# Patient Record
Sex: MALE | Race: ASIAN | Employment: OTHER | URBAN - METROPOLITAN AREA
[De-identification: names, ages, dates, MRNs, and addresses within clinical notes are randomized per-mention and may not be internally consistent; named-entity substitution may affect disease eponyms.]

---

## 2017-01-01 ENCOUNTER — OFFICE VISIT (OUTPATIENT)
Dept: FAMILY MEDICINE CLINIC | Age: 82
End: 2017-01-01

## 2017-01-01 VITALS
RESPIRATION RATE: 14 BRPM | SYSTOLIC BLOOD PRESSURE: 123 MMHG | DIASTOLIC BLOOD PRESSURE: 55 MMHG | HEIGHT: 66 IN | TEMPERATURE: 96.1 F | WEIGHT: 130.8 LBS | BODY MASS INDEX: 21.02 KG/M2 | OXYGEN SATURATION: 98 % | HEART RATE: 64 BPM

## 2017-01-01 DIAGNOSIS — R63.0 DECREASED APPETITE: ICD-10-CM

## 2017-01-01 DIAGNOSIS — F50.9 APPETITE DISORDER: Primary | ICD-10-CM

## 2017-01-01 DIAGNOSIS — I10 ESSENTIAL HYPERTENSION: ICD-10-CM

## 2017-01-01 RX ORDER — LISINOPRIL 10 MG/1
TABLET ORAL
Refills: 3 | COMMUNITY
Start: 2017-01-01 | End: 2018-01-01 | Stop reason: ALTCHOICE

## 2017-01-01 RX ORDER — CARVEDILOL 12.5 MG/1
TABLET ORAL
Refills: 3 | COMMUNITY
Start: 2017-10-19 | End: 2018-01-01 | Stop reason: ALTCHOICE

## 2017-01-01 RX ORDER — MIRTAZAPINE 7.5 MG/1
7.5 TABLET, FILM COATED ORAL
Qty: 30 TAB | Refills: 1 | Status: SHIPPED | OUTPATIENT
Start: 2017-01-01 | End: 2018-01-01 | Stop reason: SDUPTHER

## 2017-01-01 RX ORDER — NYSTATIN 100000 U/G
OINTMENT TOPICAL
COMMUNITY
Start: 2017-01-01 | End: 2018-01-01 | Stop reason: ALTCHOICE

## 2017-01-01 RX ORDER — ASPIRIN 325 MG
TABLET, DELAYED RELEASE (ENTERIC COATED) ORAL
Refills: 3 | COMMUNITY
Start: 2017-01-01 | End: 2018-01-01 | Stop reason: ALTCHOICE

## 2017-01-01 RX ORDER — PRAVASTATIN SODIUM 40 MG/1
TABLET ORAL
Refills: 3 | COMMUNITY
Start: 2017-01-01 | End: 2018-01-01 | Stop reason: ALTCHOICE

## 2017-01-01 RX ORDER — SULFAMETHOXAZOLE AND TRIMETHOPRIM 800; 160 MG/1; MG/1
TABLET ORAL
Refills: 0 | COMMUNITY
Start: 2017-01-01 | End: 2018-01-01 | Stop reason: ALTCHOICE

## 2017-01-01 RX ORDER — TRAMADOL HYDROCHLORIDE 50 MG/1
TABLET ORAL
Refills: 0 | COMMUNITY
Start: 2017-01-01 | End: 2018-01-01 | Stop reason: ALTCHOICE

## 2017-01-24 ENCOUNTER — OFFICE VISIT (OUTPATIENT)
Dept: FAMILY MEDICINE CLINIC | Age: 82
End: 2017-01-24

## 2017-01-24 VITALS
RESPIRATION RATE: 18 BRPM | DIASTOLIC BLOOD PRESSURE: 65 MMHG | OXYGEN SATURATION: 98 % | TEMPERATURE: 95.9 F | BODY MASS INDEX: 20.57 KG/M2 | HEIGHT: 66 IN | SYSTOLIC BLOOD PRESSURE: 127 MMHG | HEART RATE: 79 BPM | WEIGHT: 128 LBS

## 2017-01-24 DIAGNOSIS — R00.0 SINUS TACHYCARDIA: ICD-10-CM

## 2017-01-24 DIAGNOSIS — I10 ESSENTIAL HYPERTENSION WITH GOAL BLOOD PRESSURE LESS THAN 140/90: ICD-10-CM

## 2017-01-24 DIAGNOSIS — R73.03 PREDIABETES: ICD-10-CM

## 2017-01-24 DIAGNOSIS — R20.2 TINGLING IN EXTREMITIES: ICD-10-CM

## 2017-01-24 DIAGNOSIS — E78.00 HYPERCHOLESTEROLEMIA: ICD-10-CM

## 2017-01-24 DIAGNOSIS — M10.9 GOUT OF BIG TOE: Primary | ICD-10-CM

## 2017-01-24 DIAGNOSIS — R94.4 KIDNEY FUNCTION STUDY ABNORMALITY: ICD-10-CM

## 2017-01-24 RX ORDER — LISINOPRIL 30 MG/1
30 TABLET ORAL DAILY
Qty: 90 TAB | Refills: 3 | Status: SHIPPED | OUTPATIENT
Start: 2017-01-24 | End: 2017-02-01 | Stop reason: ALTCHOICE

## 2017-01-24 RX ORDER — OXYCODONE AND ACETAMINOPHEN 5; 325 MG/1; MG/1
1 TABLET ORAL
Qty: 20 TAB | Refills: 0 | Status: SHIPPED | OUTPATIENT
Start: 2017-01-24 | End: 2018-01-01 | Stop reason: ALTCHOICE

## 2017-01-24 RX ORDER — INDOMETHACIN 50 MG/1
50 CAPSULE ORAL 3 TIMES DAILY
Qty: 15 CAP | Refills: 0 | Status: SHIPPED | OUTPATIENT
Start: 2017-01-24 | End: 2017-01-29

## 2017-01-24 RX ORDER — DEXAMETHASONE 1.5 MG/1
TABLET ORAL
Qty: 21 TAB | Refills: 0 | Status: SHIPPED | OUTPATIENT
Start: 2017-01-24 | End: 2018-01-01 | Stop reason: ALTCHOICE

## 2017-01-24 RX ORDER — LISINOPRIL AND HYDROCHLOROTHIAZIDE 20; 25 MG/1; MG/1
1 TABLET ORAL DAILY
Qty: 180 TAB | Refills: 3 | Status: SHIPPED | OUTPATIENT
Start: 2017-01-24 | End: 2017-02-01 | Stop reason: ALTCHOICE

## 2017-01-24 NOTE — MR AVS SNAPSHOT
Visit Information Date & Time Provider Department Dept. Phone Encounter #  
 1/24/2017 12:30 PM Pool Huynh MD Edilberto Miller OFFICE-[facility] [phone] [id number] Your Appointments 6/20/2017 10:30 AM  
PACEMAKER with PACEMAKER, [facility]) Appt Note: 6mo bsc bivpm  
 [id number] [facility]  
[phone] [id number] [facility] Upcoming Health Maintenance Date Due  
 GLAUCOMA SCREENING Q2Y 6/27/1997 INFLUENZA AGE 9 TO ADULT 8/1/2016 MEDICARE YEARLY EXAM 6/29/2017 DTaP/Tdap/Td series (3 - Td) 12/12/2026 Allergies as of 1/24/2017  Review Complete On: 1/24/2017 By: Swapna Miranda LPN No Known Allergies Current Immunizations  Reviewed on 12/2/2015 Name Date Influenza High Dose Vaccine PF 11/3/2014 Influenza Vaccine 12/2/2015 Influenza Vaccine PF 12/19/2013 Influenza Vaccine Split 10/3/2012, 10/22/2011 Pneumococcal Conjugate (PCV-13) 8/22/2013 Td, Adsorbed PF 12/12/2016 Tdap 8/22/2013 Varicella Virus Vaccine 2/20/2014 Not reviewed this visit You Were Diagnosed With   
  
 Codes Comments Gout of big toe    -  Primary ICD-10-CM: M10.9 ICD-9-CM: 274.01 Tingling in extremities     ICD-10-CM: R20.2 ICD-9-CM: 782.0 Essential hypertension with goal blood pressure less than 140/90     ICD-10-CM: I10 
ICD-9-CM: 401.9 Prediabetes     ICD-10-CM: R73.03 
ICD-9-CM: 790.29 Hypercholesterolemia     ICD-10-CM: E78.00 ICD-9-CM: 272.0 Sinus tachycardia     ICD-10-CM: R00.0 ICD-9-CM: 427.89 Kidney function study abnormality     ICD-10-CM: R94.4 ICD-9-CM: 794.4 Vitals BP Pulse Temp Resp Height(growth percentile) Weight(growth percentile) 127/65 (BP 1 Location: Left arm, BP Patient Position: Sitting) 79 95.9 °F (35.5 °C) (Oral) 18 5' 6\" (1.676 m) 128 lb (58.1 kg) SpO2 BMI Smoking Status 98% 20.66 kg/m2 Never Smoker Vitals History BMI and BSA Data Body Mass Index Body Surface Area  
 20.66 kg/m 2 1.64 m 2 Preferred Pharmacy Pharmacy Name Phone [id number] [facility] [phone] Your Updated Medication List  
  
   
This list is accurate as of: 1/24/17  1:39 PM.  Always use your most recent med list.  
  
  
  
  
 aspirin, buffered 81 mg Tab Take  by mouth.  
  
 calcium-cholecalciferol (D3) tablet Commonly known as:  CALTRATE 600+D Take 1 Tab by mouth two (2) times a day. dexamethasone 1.5 mg tablet Commonly known as:  DECADRON  
3 tabs in am 3 tabs in pm first day, 3 tabs in am 2 tabs in pm second day, 2 tabs in am and 2 tabs in pm 3rd day, then 2 tabs in am and one tab in pm fourth day and 1 tab in am, and one tab in pm on the fifth day, then 1 tab in am on the six day ELIQUIS 2.5 mg tablet Generic drug:  apixaban TAKE 1 TABLET BY MOUTH TWICE A DAY  
  
 febuxostat 40 mg Tab tablet Commonly known as:  Constance Pouch Take 1 Tab by mouth daily. Indications: GOUT, GOUT PREVENTION  
  
 FISH OIL 1,000 mg Cap Generic drug:  omega-3 fatty acids-vitamin e Take 1 capsule by mouth daily. hydrocortisone 2.5 % rectal cream  
Commonly known as:  ANUSOL-HC Insert  into rectum four (4) times daily. indomethacin 50 mg capsule Commonly known as:  INDOCIN Take 1 Cap by mouth three (3) times daily for 5 days. lactobacillus rhamnosus gg 10 billion cell 10 billion cell capsule Commonly known as:  PROBIOTIC Take 1 capsule by mouth daily. lisinopril 30 mg tablet Commonly known as:  Rubina Morenoi Take 1 Tab by mouth daily. lisinopril-hydroCHLOROthiazide 20-25 mg per tablet Commonly known as:  Conda Franc Take 1 Tab by mouth daily. metoprolol tartrate 25 mg tablet Commonly known as:  LOPRESSOR Take 1 Tab by mouth every twelve (12) hours. mirtazapine 7.5 mg tablet Commonly known as:  [place] Brightly Take 1 Tab by mouth nightly. nystatin topical cream  
Commonly known as:  MYCOSTATIN Apply  to affected area two (2) times a day. omeprazole 40 mg capsule Commonly known as:  PRILOSEC Take 1 capsule by mouth daily. oxyCODONE-acetaminophen 5-325 mg per tablet Commonly known as:  PERCOCET Take 1 Tab by mouth every eight (8) hours as needed for Pain. Max Daily Amount: 3 Tabs. pramoxine-hydrocortisone 1-1 % topical cream  
Commonly known as:  ANALPRAM HC Apply  to affected area three (3) times daily. predniSONE 20 mg tablet Commonly known as:  Dyana Beam Take 3 Tabs by mouth daily (with breakfast). tamsulosin 0.4 mg capsule Commonly known as:  FLOMAX Take 2 Caps by mouth nightly. Indications: SYMPTOMATIC BENIGN PROSTATIC HYPERPLASIA Prescriptions Printed Refills  
 dexamethasone (DECADRON) 1.5 mg tablet 0 Sig: 3 tabs in am 3 tabs in pm first day, 3 tabs in am 2 tabs in pm second day, 2 tabs in am and 2 tabs in pm 3rd day, then 2 tabs in am and one tab in pm fourth day and 1 tab in am, and one tab in pm on the fifth day, then 1 tab in am on the six day Class: Print  
 oxyCODONE-acetaminophen (PERCOCET) 5-325 mg per tablet 0 Sig: Take 1 Tab by mouth every eight (8) hours as needed for Pain. Max Daily Amount: 3 Tabs. Class: Print Route: Oral  
 indomethacin (INDOCIN) 50 mg capsule 0 Sig: Take 1 Cap by mouth three (3) times daily for 5 days. Class: Print Route: Oral  
 lisinopril-hydroCHLOROthiazide (PRINZIDE, ZESTORETIC) 20-25 mg per tablet 3 Sig: Take 1 Tab by mouth daily. Class: Print Route: Oral  
 lisinopril (PRINIVIL, ZESTRIL) 30 mg tablet 3 Sig: Take 1 Tab by mouth daily. Class: Print Route: Oral  
  
We Performed the Following CBC W/O DIFF [55277 CPT(R)] METABOLIC PANEL, COMPREHENSIVE [87248 CPT(R)] URIC ACID [id number] CPT(R)] Patient Instructions Gout: Care Instructions Your Care Instructions Gout is a form of arthritis caused by a buildup of uric acid crystals in a joint. It causes sudden attacks of pain, swelling, redness, and stiffness, usually in one joint, especially the big toe. Gout usually comes on without a cause. But it can be brought on by drinking alcohol (especially beer) or eating seafood and red meat. Taking certain medicines, such as diuretics or aspirin, also can bring on an attack of gout. Taking your medicines as prescribed and following up with your doctor regularly can help you avoid gout attacks in the future. Follow-up care is a key part of your treatment and safety. Be sure to make and go to all appointments, and call your doctor if you are having problems. Its also a good idea to know your test results and keep a list of the medicines you take. How can you care for yourself at home? · If the joint is swollen, put ice or a cold pack on the area for 10 to 20 minutes at a time. Put a thin cloth between the ice and your skin. · Prop up the sore limb on a pillow when you ice it or anytime you sit or lie down during the next 3 days. Try to keep it above the level of your heart. This will help reduce swelling. · Rest sore joints. Avoid activities that put weight or strain on the joints for a few days. Take short rest breaks from your regular activities during the day. · Take your medicines exactly as prescribed. Call your doctor if you think you are having a problem with your medicine. · Take pain medicines exactly as directed. ¨ If the doctor gave you a prescription medicine for pain, take it as prescribed. ¨ If you are not taking a prescription pain medicine, ask your doctor if you can take an over-the-counter medicine. · Eat less seafood and red meat. · Check with your doctor before drinking alcohol. · Losing weight, if you are overweight, may help reduce attacks of gout. But do not go on a [employer]. \" Losing a lot of weight in a short amount of time can cause a gout attack. When should you call for help? Call your doctor now or seek immediate medical care if: 
· You have a fever. · The joint is so painful you cannot use it. · You have sudden, unexplained swelling, redness, warmth, or severe pain in one or more joints. Watch closely for changes in your health, and be sure to contact your doctor if: 
· You have joint pain. · Your symptoms get worse or are not improving after 2 or 3 days. Where can you learn more? Go to http://suzy-sofya.info/. Enter C279 in the search box to learn more about \"Gout: Care Instructions. \" Current as of: February 24, 2016 Content Version: 11.1 © 4300-3100 [employer]. Care instructions adapted under license by [employer] (which disclaims liability or warranty for this information). If you have questions about a medical condition or this instruction, always ask your healthcare professional. [place] [address] any warranty or liability for your use of this information. Numbness and Tingling: Care Instructions Your Care Instructions Many things can cause numbness or tingling. Swelling may put pressure on a nerve. This could cause you to lose feeling or have a pins-and-needles sensation on part of your body. Nerves may be damaged from trauma, toxins, or diseases, such as diabetes or multiple sclerosis (MS). Sometimes, though, the cause is not clear. If there is no clear reason for your symptoms, and you are not having any other symptoms, your doctor may suggest watching and waiting for a while to see if the numbness or tingling goes away on its own. Your doctor may want you to have blood or nerve tests to find the cause of your symptoms. Follow-up care is a key part of your treatment and safety. Be sure to make and go to all appointments, and call your doctor if you are having problems. It's also a good idea to know your test results and keep a list of the medicines you take. How can you care for yourself at home? · If your doctor prescribes medicine, take it exactly as directed. Call your doctor if you think you are having a problem with your medicine. · If you have any swelling, put ice or a cold pack on the area for 10 to 20 minutes at a time. Put a thin cloth between the ice and your skin. When should you call for help? Call 911 anytime you think you may need emergency care. For example, call if: 
· You have weakness, numbness, or tingling in both legs. · You lose bowel or bladder control. · You have symptoms of a stroke. These may include: 
¨ Sudden numbness, tingling, weakness, or loss of movement in your face, arm, or leg, especially on only one side of your body. ¨ Sudden vision changes. ¨ Sudden trouble speaking. ¨ Sudden confusion or trouble understanding simple statements. ¨ Sudden problems with walking or balance. ¨ A sudden, severe headache that is different from past headaches. Watch closely for changes in your health, and be sure to contact your doctor if you have any problems, or if: 
· You do not get better as expected. Where can you learn more? Go to http://suzy-sofya.info/. Enter B179 in the search box to learn more about \"Numbness and Tingling: Care Instructions. \" Current as of: February 19, 2016 Content Version: 11.1 © 1560-2798 Healthwise[employer] Inc[employer]. Care instructions adapted under license by [employer] (which disclaims liability or warranty for this information). If you have questions about a medical condition or this instruction, always ask your healthcare professional. [address] any warranty or liability for your use of this information. Purine-Restricted Diet: Care Instructions Your Care Instructions Purines are substances that are found in some foods. Your body turns purines into uric acid. High levels of uric acid can cause gout, which is a form of arthritis that causes pain and inflammation in joints. You may be able to help control the amount of uric acid in your body by limiting high-purine foods in your diet. Follow-up care is a key part of your treatment and safety. Be sure to make and go to all appointments, and call your doctor if you are having problems. It's also a good idea to know your test results and keep a list of the medicines you take. How can you care for yourself at home? · Plan your meals and snacks around foods that are low in purines and are safe for you to eat. These foods include: ¨ Green vegetables and tomatoes. ¨ Fruits. ¨ Whole-grain breads, rice, and cereals. ¨ Eggs, peanut butter, and nuts. ¨ Low-fat milk, cheese, and other milk products. ¨ Popcorn. ¨ Gelatin desserts, chocolate, cocoa, and cakes and sweets, in small amounts. · You can eat certain foods that are medium-high in purines, but eat them only once in a while. These foods include: ¨ Legumes, such as dried beans and dried peas. You can have 1 cup cooked legumes each day. ¨ Asparagus, cauliflower, spinach, mushrooms, and green peas. ¨ Fish and seafood (other than very high-purine seafood). ¨ Oatmeal, wheat bran, and wheat germ. · Limit very high-purine foods, including: ¨ Organ meats, such as liver, kidneys, sweetbreads, and brains. ¨ Meats, including sharif, beef, pork, and lamb. ¨ Game meats and any other meats in large amounts. ¨ Anchovies, sardines, herring, mackerel, and scallops. ¨ Gravy. ¨ Beer. Where can you learn more? Go to http://suzy-sofya.info/. Enter F448 in the search box to learn more about \"Purine-Restricted Diet: Care Instructions. \" Current as of: July 26, 2016 Content Version: 11.1 © 7402-5343 Healthwise, Incorporated. Care instructions adapted under license by StyleSeek (which disclaims liability or warranty for this information). If you have questions about a medical condition or this instruction, always ask your healthcare professional. [address] any warranty or liability for your use of this information. Introducing hospitals & HEALTH SERVICES! Dear [employer]: Thank you for requesting a Aternity account. Our records indicate that you have previously registered for a Aternity account but its currently inactive. Please call our Aternity support line at [phone]. Additional Information If you have questions, please visit the Frequently Asked Questions section of the Aternity website at [email or web address] Remember, Aternity is NOT to be used for urgent needs. For medical emergencies, dial 911. Now available from your iPhone and Android! Please provide this summary of care documentation to your next provider. Your primary care clinician is listed as Pradeep Black. If you have any questions after today's visit, please call [phone].

## 2017-01-24 NOTE — PATIENT INSTRUCTIONS
Gout: Care Instructions  Your Care Instructions  Gout is a form of arthritis caused by a buildup of uric acid crystals in a joint. It causes sudden attacks of pain, swelling, redness, and stiffness, usually in one joint, especially the big toe. Gout usually comes on without a cause. But it can be brought on by drinking alcohol (especially beer) or eating seafood and red meat. Taking certain medicines, such as diuretics or aspirin, also can bring on an attack of gout. Taking your medicines as prescribed and following up with your doctor regularly can help you avoid gout attacks in the future. Follow-up care is a key part of your treatment and safety. Be sure to make and go to all appointments, and call your doctor if you are having problems. Its also a good idea to know your test results and keep a list of the medicines you take. How can you care for yourself at home? · If the joint is swollen, put ice or a cold pack on the area for 10 to 20 minutes at a time. Put a thin cloth between the ice and your skin. · Prop up the sore limb on a pillow when you ice it or anytime you sit or lie down during the next 3 days. Try to keep it above the level of your heart. This will help reduce swelling. · Rest sore joints. Avoid activities that put weight or strain on the joints for a few days. Take short rest breaks from your regular activities during the day. · Take your medicines exactly as prescribed. Call your doctor if you think you are having a problem with your medicine. · Take pain medicines exactly as directed. ¨ If the doctor gave you a prescription medicine for pain, take it as prescribed. ¨ If you are not taking a prescription pain medicine, ask your doctor if you can take an over-the-counter medicine. · Eat less seafood and red meat. · Check with your doctor before drinking alcohol. · Losing weight, if you are overweight, may help reduce attacks of gout. But do not go on a [employer]. \" Losing a lot of weight in a short amount of time can cause a gout attack. When should you call for help? Call your doctor now or seek immediate medical care if:  · You have a fever. · The joint is so painful you cannot use it. · You have sudden, unexplained swelling, redness, warmth, or severe pain in one or more joints. Watch closely for changes in your health, and be sure to contact your doctor if:  · You have joint pain. · Your symptoms get worse or are not improving after 2 or 3 days. Where can you learn more? Go to http://suzy-sfoya.info/. Enter S568 in the search box to learn more about \"Gout: Care Instructions. \"  Current as of: February 24, 2016  Content Version: 11.1  © 6347-9575 [employer]. Care instructions adapted under license by [employer]igen (which disclaims liability or warranty for this information). If you have questions about a medical condition or this instruction, always ask your healthcare professional. [place] [address] any warranty or liability for your use of this information. Numbness and Tingling: Care Instructions  Your Care Instructions  Many things can cause numbness or tingling. Swelling may put pressure on a nerve. This could cause you to lose feeling or have a pins-and-needles sensation on part of your body. Nerves may be damaged from trauma, toxins, or diseases, such as diabetes or multiple sclerosis (MS). Sometimes, though, the cause is not clear. If there is no clear reason for your symptoms, and you are not having any other symptoms, your doctor may suggest watching and waiting for a while to see if the numbness or tingling goes away on its own. Your doctor may want you to have blood or nerve tests to find the cause of your symptoms. Follow-up care is a key part of your treatment and safety. Be sure to make and go to all appointments, and call your doctor if you are having problems.  It's also a good idea to know your test results and keep a list of the medicines you take. How can you care for yourself at home? · If your doctor prescribes medicine, take it exactly as directed. Call your doctor if you think you are having a problem with your medicine. · If you have any swelling, put ice or a cold pack on the area for 10 to 20 minutes at a time. Put a thin cloth between the ice and your skin. When should you call for help? Call 911 anytime you think you may need emergency care. For example, call if:  · You have weakness, numbness, or tingling in both legs. · You lose bowel or bladder control. · You have symptoms of a stroke. These may include:  ¨ Sudden numbness, tingling, weakness, or loss of movement in your face, arm, or leg, especially on only one side of your body. ¨ Sudden vision changes. ¨ Sudden trouble speaking. ¨ Sudden confusion or trouble understanding simple statements. ¨ Sudden problems with walking or balance. ¨ A sudden, severe headache that is different from past headaches. Watch closely for changes in your health, and be sure to contact your doctor if you have any problems, or if:  · You do not get better as expected. Where can you learn more? Go to http://suzy-sofya.info/. Enter P624 in the search box to learn more about \"Numbness and Tingling: Care Instructions. \"  Current as of: February 19, 2016  Content Version: 11.1  © 4737-6104 [email or web address].[email or web address]. Care instructions adapted under license by [employer] (which disclaims liability or warranty for this information). If you have questions about a medical condition or this instruction, always ask your healthcare professional. [address] any warranty or liability for your use of this information. Purine-Restricted Diet: Care Instructions  Your Care Instructions  Purines are substances that are found in some foods. Your body turns purines into uric acid.  High levels of uric acid can cause gout, which is a form of arthritis that causes pain and inflammation in joints. You may be able to help control the amount of uric acid in your body by limiting high-purine foods in your diet. Follow-up care is a key part of your treatment and safety. Be sure to make and go to all appointments, and call your doctor if you are having problems. It's also a good idea to know your test results and keep a list of the medicines you take. How can you care for yourself at home? · Plan your meals and snacks around foods that are low in purines and are safe for you to eat. These foods include:  ¨ Green vegetables and tomatoes. ¨ Fruits. ¨ Whole-grain breads, rice, and cereals. ¨ Eggs, peanut butter, and nuts. ¨ Low-fat milk, cheese, and other milk products. ¨ Popcorn. ¨ Gelatin desserts, chocolate, cocoa, and cakes and sweets, in small amounts. · You can eat certain foods that are medium-high in purines, but eat them only once in a while. These foods include:  ¨ Legumes, such as dried beans and dried peas. You can have 1 cup cooked legumes each day. ¨ Asparagus, cauliflower, spinach, mushrooms, and green peas. ¨ Fish and seafood (other than very high-purine seafood). ¨ Oatmeal, wheat bran, and wheat germ. · Limit very high-purine foods, including:  ¨ Organ meats, such as liver, kidneys, sweetbreads, and brains. ¨ Meats, including sharif, beef, pork, and lamb. ¨ Game meats and any other meats in large amounts. ¨ Anchovies, sardines, herring, mackerel, and scallops. ¨ Gravy. ¨ Beer. Where can you learn more? Go to http://suzy-sofya.info/. Enter F448 in the search box to learn more about \"Purine-Restricted Diet: Care Instructions. \"  Current as of: July 26, 2016  Content Version: 11.1  © 4654-6237 [employer]. Care instructions adapted under license by Reclog (which disclaims liability or warranty for this information).  If you have questions about a medical condition or this instruction, always ask your healthcare professional. [place] [address] any warranty or liability for your use of this information.
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
## 2017-01-24 NOTE — PROGRESS NOTES
Chief Complaint   Patient presents with    Toe Pain     right great toe     C/o right great toe pain and swelling x 1 week,  Denies injury,  Hard to bear weight

## 2017-01-25 LAB
ALBUMIN SERPL-MCNC: 4.4 G/DL (ref 3.5–4.7)
ALBUMIN/GLOB SERPL: 1.2 {RATIO} (ref 1.1–2.5)
ALP SERPL-CCNC: 57 IU/L (ref 39–117)
ALT SERPL-CCNC: 24 IU/L (ref 0–44)
AST SERPL-CCNC: 27 IU/L (ref 0–40)
BILIRUB SERPL-MCNC: 0.9 MG/DL (ref 0–1.2)
BUN SERPL-MCNC: 23 MG/DL (ref 8–27)
BUN/CREAT SERPL: 18 (ref 10–22)
CALCIUM SERPL-MCNC: 9.8 MG/DL (ref 8.6–10.2)
CHLORIDE SERPL-SCNC: 98 MMOL/L (ref 96–106)
CO2 SERPL-SCNC: 29 MMOL/L (ref 18–29)
CREAT SERPL-MCNC: 1.27 MG/DL (ref 0.76–1.27)
ERYTHROCYTE [DISTWIDTH] IN BLOOD BY AUTOMATED COUNT: 12.6 % (ref 12.3–15.4)
GLOBULIN SER CALC-MCNC: 3.6 G/DL (ref 1.5–4.5)
GLUCOSE SERPL-MCNC: 110 MG/DL (ref 65–99)
HCT VFR BLD AUTO: 42 % (ref 37.5–51)
HGB BLD-MCNC: 14.3 G/DL (ref 12.6–17.7)
MCH RBC QN AUTO: 31.6 PG (ref 26.6–33)
MCHC RBC AUTO-ENTMCNC: 34 G/DL (ref 31.5–35.7)
MCV RBC AUTO: 93 FL (ref 79–97)
PLATELET # BLD AUTO: 244 X10E3/UL (ref 150–379)
POTASSIUM SERPL-SCNC: 3.7 MMOL/L (ref 3.5–5.2)
PROT SERPL-MCNC: 8 G/DL (ref 6–8.5)
RBC # BLD AUTO: 4.52 X10E6/UL (ref 4.14–5.8)
SODIUM SERPL-SCNC: 142 MMOL/L (ref 134–144)
URATE SERPL-MCNC: 11.7 MG/DL (ref 3.7–8.6)
WBC # BLD AUTO: 8 X10E3/UL (ref 3.4–10.8)

## 2017-02-01 ENCOUNTER — OFFICE VISIT (OUTPATIENT)
Dept: FAMILY MEDICINE CLINIC | Age: 82
End: 2017-02-01

## 2017-02-01 ENCOUNTER — TELEPHONE (OUTPATIENT)
Dept: CARDIOLOGY CLINIC | Age: 82
End: 2017-02-01

## 2017-02-01 VITALS
DIASTOLIC BLOOD PRESSURE: 87 MMHG | TEMPERATURE: 97 F | HEIGHT: 66 IN | HEART RATE: 74 BPM | OXYGEN SATURATION: 96 % | WEIGHT: 134.8 LBS | BODY MASS INDEX: 21.66 KG/M2 | SYSTOLIC BLOOD PRESSURE: 177 MMHG | RESPIRATION RATE: 12 BRPM

## 2017-02-01 DIAGNOSIS — Z13.5 SCREENING FOR GLAUCOMA: Primary | ICD-10-CM

## 2017-02-01 DIAGNOSIS — M10.271 ACUTE DRUG-INDUCED GOUT INVOLVING TOE OF RIGHT FOOT: ICD-10-CM

## 2017-02-01 DIAGNOSIS — R73.03 PREDIABETES: ICD-10-CM

## 2017-02-01 DIAGNOSIS — E78.00 HYPERCHOLESTEROLEMIA: ICD-10-CM

## 2017-02-01 DIAGNOSIS — I10 ESSENTIAL HYPERTENSION WITH GOAL BLOOD PRESSURE LESS THAN 140/90: ICD-10-CM

## 2017-02-01 DIAGNOSIS — R94.4 KIDNEY FUNCTION STUDY ABNORMALITY: ICD-10-CM

## 2017-02-01 RX ORDER — METOPROLOL TARTRATE 25 MG/1
25 TABLET, FILM COATED ORAL EVERY 12 HOURS
Qty: 180 TAB | Refills: 3 | Status: SHIPPED | OUTPATIENT
Start: 2017-02-01 | End: 2018-01-01 | Stop reason: ALTCHOICE

## 2017-02-01 RX ORDER — AMLODIPINE AND OLMESARTAN MEDOXOMIL 5; 20 MG/1; MG/1
1 TABLET ORAL DAILY
Qty: 90 TAB | Refills: 3 | Status: SHIPPED | OUTPATIENT
Start: 2017-02-01 | End: 2018-01-01 | Stop reason: ALTCHOICE

## 2017-02-01 RX ORDER — APIXABAN 2.5 MG/1
TABLET, FILM COATED ORAL
Qty: 180 TAB | Refills: 2 | Status: SHIPPED | OUTPATIENT
Start: 2017-02-01 | End: 2018-01-01 | Stop reason: ALTCHOICE
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
## 2017-02-01 NOTE — TELEPHONE ENCOUNTER
Spoke with Cheyanne Stone from Dr. Jackie Rodriguez office. Requested refill for Eliquis for 90 days since patient is going to [facility] for 3 months.

## 2017-02-01 NOTE — TELEPHONE ENCOUNTER
Nini Iglesias from 's office called and left message on my voice mail stating that pt is going out of country and this is concerning his Eliquis. She is asking that someone call back. He is joyce pt and has not seen Yovany in over 2 years.

## 2017-02-01 NOTE — MR AVS SNAPSHOT
Visit Information Date & Time Provider Department Dept. Phone Encounter #  
 2/1/2017 10:15 AM Jas Zamudio MD [address] [facility] OFFICE-ANNEX [phone] [id number] Your Appointments 6/20/2017 10:30 AM  
PACEMAKER with PACEMAKER, [facility] [address]) Appt Note: 6mo bsc bivpm  
 [address]  
[phone] [address] Upcoming Health Maintenance Date Due  
 GLAUCOMA SCREENING Q2Y 6/27/1997 INFLUENZA AGE 9 TO ADULT 8/1/2016 MEDICARE YEARLY EXAM 6/29/2017 DTaP/Tdap/Td series (3 - Td) 12/12/2026 Allergies as of 2/1/2017  Review Complete On: 1/24/2017 By: Isabella Louis LPN No Known Allergies Current Immunizations  Reviewed on 12/2/2015 Name Date Influenza High Dose Vaccine PF 11/3/2014 Influenza Vaccine 12/2/2015 Influenza Vaccine PF 12/19/2013 Influenza Vaccine Split 10/3/2012, 10/22/2011 Pneumococcal Conjugate (PCV-13) 8/22/2013 Td, Adsorbed PF 12/12/2016 Tdap 8/22/2013 Varicella Virus Vaccine 2/20/2014 Not reviewed this visit You Were Diagnosed With   
  
 Codes Comments Screening for glaucoma    -  Primary ICD-10-CM: Z13.5 ICD-9-CM: V80.1 Acute drug-induced gout involving toe of right foot     ICD-10-CM: M10.271 ICD-9-CM: 274.01 Essential hypertension with goal blood pressure less than 140/90     ICD-10-CM: I10 
ICD-9-CM: 401.9 Prediabetes     ICD-10-CM: R73.03 
ICD-9-CM: 790.29 Hypercholesterolemia     ICD-10-CM: E78.00 ICD-9-CM: 272.0 Kidney function study abnormality     ICD-10-CM: R94.4 ICD-9-CM: 794.4 Vitals BP Pulse Temp Resp Height(growth percentile) Weight(growth percentile) 177/87 (BP 1 Location: Left arm, BP Patient Position: At rest) 74 97 °F (36.1 °C) (Oral) 12 5' 6\" (1.676 m) 134 lb 12.8 oz (61.1 kg) SpO2 BMI Smoking Status 96% 21.76 kg/m2 Never Smoker Vitals History BMI and BSA Data Body Mass Index Body Surface Area 21.76 kg/m 2 1.69 m 2 Preferred Pharmacy Pharmacy Name Phone [phone] [phone] Your Updated Medication List  
  
   
This list is accurate as of: 2/1/17 11:06 AM.  Always use your most recent med list. amLODIPine-Olmesartan 5-20 mg Tab Commonly known as:  MAGALIE Take 1 Tab by mouth daily. aspirin, buffered 81 mg Tab Take  by mouth.  
  
 calcium-cholecalciferol (D3) tablet Commonly known as:  CALTRATE 600+D Take 1 Tab by mouth two (2) times a day. dexamethasone 1.5 mg tablet Commonly known as:  DECADRON  
3 tabs in am 3 tabs in pm first day, 3 tabs in am 2 tabs in pm second day, 2 tabs in am and 2 tabs in pm 3rd day, then 2 tabs in am and one tab in pm fourth day and 1 tab in am, and one tab in pm on the fifth day, then 1 tab in am on the six day ELIQUIS 2.5 mg tablet Generic drug:  apixaban TAKE 1 TABLET BY MOUTH TWICE A DAY  
  
 febuxostat 40 mg Tab tablet Commonly known as:  Patsie Reddish Take 1 Tab by mouth daily. Indications: GOUT, GOUT PREVENTION  
  
 FISH OIL 1,000 mg Cap Generic drug:  omega-3 fatty acids-vitamin e Take 1 capsule by mouth daily. hydrocortisone 2.5 % rectal cream  
Commonly known as:  ANUSOL-HC Insert  into rectum four (4) times daily. lactobacillus rhamnosus gg 10 billion cell 10 billion cell capsule Commonly known as:  PROBIOTIC Take 1 capsule by mouth daily. metoprolol tartrate 25 mg tablet Commonly known as:  LOPRESSOR Take 1 Tab by mouth every twelve (12) hours. mirtazapine 7.5 mg tablet Commonly known as:  Ardath Keyla Take 1 Tab by mouth nightly. nystatin topical cream  
Commonly known as:  MYCOSTATIN Apply  to affected area two (2) times a day. omeprazole 40 mg capsule Commonly known as:  PRILOSEC Take 1 capsule by mouth daily. oxyCODONE-acetaminophen 5-325 mg per tablet Commonly known as:  PERCOCET Take 1 Tab by mouth every eight (8) hours as needed for Pain. Max Daily Amount: 3 Tabs. pramoxine-hydrocortisone 1-1 % topical cream  
Commonly known as:  ANALPRAM HC Apply  to affected area three (3) times daily. tamsulosin 0.4 mg capsule Commonly known as:  FLOMAX Take 2 Caps by mouth nightly. Indications: SYMPTOMATIC BENIGN PROSTATIC HYPERPLASIA Prescriptions Printed Refills  
 amLODIPine-Olmesartan (MAGALIE) 5-20 mg tab 3 Sig: Take 1 Tab by mouth daily. Class: Print Route: Oral  
 metoprolol tartrate (LOPRESSOR) 25 mg tablet 3 Sig: Take 1 Tab by mouth every twelve (12) hours. Class: Print Route: Oral  
  
We Performed the Following REFERRAL TO OPTOMETRY [id number] Custom] Comments:  
 Please evaluate patient for screen/glaucoma. Referral Information Referral ID Referred By Referred To  
  
 [id number] ASHRAFI, ABBAS Cherylle Kayser, MD   
   [id number], [address] [place] [address] Street Phone: [phone] Fax: [phone] Visits Status Start Date End Date 1 New Request 2/1/17 2/1/18 If your referral has a status of pending review or denied, additional information will be sent to support the outcome of this decision. Introducing [facility]! Dear [employer]: Thank you for requesting a [employer] account. Our records indicate that you have previously registered for a [employer] account but its currently inactive. Please call our [employer] support line at [phone]. Additional Information If you have questions, please visit the Frequently Asked Questions section of the [employer] website at [email or web address] Remember, [employer] is NOT to be used for urgent needs. For medical emergencies, dial 911. Now available from your iPhone and Android! Please provide this summary of care documentation to your next provider. Your primary care clinician is listed as Mavis Coronel. If you have any questions after today's visit, please call [phone].

## 2017-02-09 RX ORDER — INDOMETHACIN 50 MG/1
CAPSULE ORAL
Qty: 15 CAP | Refills: 15 | Status: CANCELLED | OUTPATIENT
Start: 2017-02-09

## 2017-02-13 ENCOUNTER — TELEPHONE (OUTPATIENT)
Dept: FAMILY MEDICINE CLINIC | Age: 82
End: 2017-02-13

## 2017-02-13 NOTE — TELEPHONE ENCOUNTER
Received call from Dr Rubina Hicks with [facility] ICU, stated pt is in ICU for gi bleeding. Stated pt will be scoped this morning. Requesting pts history and reason on eliquis,  Informed her of pts medical history and that eliquis was given by pts cardiologist for his hx of a flutter and pacer. She stated understanding.     Message sent to DR Delmy Scruggs

## 2017-12-13 PROBLEM — F50.9 APPETITE DISORDER: Status: ACTIVE | Noted: 2017-01-01
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
## 2017-12-13 NOTE — PATIENT INSTRUCTIONS
Anorexia: Care Instructions  Your Care Instructions    Anorexia is a type of eating disorder. People who have anorexia don't eat enough to stay at a normal weight. Sometimes they also exercise too much. They do these things because they're so afraid of gaining weight. They believe that they're fat, even when they're thin. Often they think that losing even more weight will solve their problems and make their lives better. If you have anorexia, it can really harm your health. This is because your body doesn't get the nutrition it needs. The first step to controlling the problem is admitting that something is wrong. Then counseling can help you change how you think about food, the way you see your body, and any other emotional issues. It may take months or years, but you can recover from anorexia. Follow-up care is a key part of your treatment and safety. Be sure to make and go to all appointments, and call your doctor if you are having problems. It's also a good idea to know your test results and keep a list of the medicines you take. How can you care for yourself at home? Follow your treatment plan  · Go to your counseling sessions. Call or talk with your counselor if you can't go or if you think the sessions aren't helping. Don't just stop going. · Take your medicines exactly as prescribed. Call your doctor if you think you are having a problem with your medicine. You will get more details on the specific medicines your doctor prescribes. Trust people who are helping you  · Listen to what counselors and nutrition experts say about healthy eating. · Learn about what is included in a balanced diet. Then discuss what you learn. · Let people know how you are feeling. Listen to how they are feeling too. · Accept support and feedback from other people. Learn to be easier on yourself  · Learn to focus on your good qualities. · If your body feels weak, slow down and do less.   · Remind yourself that feeling bad about yourself is all part of your disorder. · Remember that it takes time to recover from anorexia. · Spend time with other people doing things you enjoy. · Try to focus on one goal at a time. · Don't blame yourself for your disorder. Develop healthy eating habits  · With your doctor and counselor, you will decide on a good weight for you. You will also decide how much weight you will gain each week. · Try not to argue with the people you eat with. Arguing increases stress. And stress can make make it harder for you to relax and eat. · Talk with other people during meals about things that interest you. Don't talk about food or gaining weight. Caring for a loved one with anorexia  · Remind yourself that anorexia is a long-lasting disorder. It takes time for changes to occur. · Show love and support for your loved one, especially if he or she gets discouraged. · Support your loved one as he or she goes through the steps of recovery. Focus on wellness. Don't focus on food. · Take care of yourself. Continue with your own interests, career, hobbies, and friends. · Don't blame yourself or look for the reason for the disorder. · If you are having a hard time, talk with a counselor. · Learn what to do if your loved one relapses. When should you call for help? Call 911 anytime you think you may need emergency care. For example, call if:  · A person with anorexia seems depressed and is talking about suicide. If the talk about suicide seems real, stay with the person, or ask someone you trust to stay with the person, until you get emergency help. · You have a rapid or irregular heartbeat. · You passed out (lost consciousness). Call your doctor now or seek immediate medical care if:  · You feel hopeless or have thoughts of hurting yourself. Watch closely for changes in your health, and be sure to contact your doctor if:  · You have trouble sleeping. · You feel anxious or depressed.   Where can you learn more?  Go to http://suzy-sofya.info/. Enter F701 in the search box to learn more about \"Anorexia: Care Instructions. \"  Current as of: May 12, 2017  Content Version: 11.4  © 3023-6039 [employer]. Care instructions adapted under license by [employer] (which disclaims liability or warranty for this information). If you have questions about a medical condition or this instruction, always ask your healthcare professional. [address] any warranty or liability for your use of this information. Low Sodium Diet (2,000 Milligram): Care Instructions  Your Care Instructions    Too much sodium causes your body to hold on to extra water. This can raise your blood pressure and force your heart and kidneys to work harder. In very serious cases, this could cause you to be put in the hospital. It might even be life-threatening. By limiting sodium, you will feel better and lower your risk of serious problems. The most common source of sodium is salt. People get most of the salt in their diet from canned, prepared, and packaged foods. Fast food and restaurant meals also are very high in sodium. Your doctor will probably limit your sodium to less than 2,000 milligrams (mg) a day. This limit counts all the sodium in prepared and packaged foods and any salt you add to your food. Follow-up care is a key part of your treatment and safety. Be sure to make and go to all appointments, and call your doctor if you are having problems. It's also a good idea to know your test results and keep a list of the medicines you take. How can you care for yourself at home? Read food labels  · Read labels on cans and food packages. The labels tell you how much sodium is in each serving. Make sure that you look at the serving size. If you eat more than the serving size, you have eaten more sodium. · Food labels also tell you the Percent Daily Value for sodium.  Choose products with low Percent Daily Values for sodium. · Be aware that sodium can come in forms other than salt, including monosodium glutamate (MSG), sodium citrate, and sodium bicarbonate (baking soda). MSG is often added to Asian food. When you eat out, you can sometimes ask for food without MSG or added salt. Buy low-sodium foods  · Buy foods that are labeled \"unsalted\" (no salt added), \"sodium-free\" (less than 5 mg of sodium per serving), or \"low-sodium\" (less than 140 mg of sodium per serving). Foods labeled \"reduced-sodium\" and \"light sodium\" may still have too much sodium. Be sure to read the label to see how much sodium you are getting. · Buy fresh vegetables, or frozen vegetables without added sauces. Buy low-sodium versions of canned vegetables, soups, and other canned goods. Prepare low-sodium meals  · Cut back on the amount of salt you use in cooking. This will help you adjust to the taste. Do not add salt after cooking. One teaspoon of salt has about 2,300 mg of sodium. · Take the salt shaker off the table. · Flavor your food with garlic, lemon juice, onion, vinegar, herbs, and spices. Do not use soy sauce, lite soy sauce, steak sauce, onion salt, garlic salt, celery salt, mustard, or ketchup on your food. · Use low-sodium salad dressings, sauces, and ketchup. Or make your own salad dressings and sauces without adding salt. · Use less salt (or none) when recipes call for it. You can often use half the salt a recipe calls for without losing flavor. Other foods such as rice, pasta, and grains do not need added salt. · Rinse canned vegetables, and cook them in fresh water. This removes some-but not all-of the salt. · Avoid water that is naturally high in sodium or that has been treated with water softeners, which add sodium. Call your local water company to find out the sodium content of your water supply. If you buy bottled water, read the label and choose a sodium-free brand.   Avoid high-sodium foods  · Avoid eating:  ¨ Smoked, cured, salted, and canned meat, fish, and poultry. ¨ Ham, sharif, hot dogs, and luncheon meats. ¨ Regular, hard, and processed cheese and regular peanut butter. ¨ Crackers with salted tops, and other salted snack foods such as pretzels, chips, and salted popcorn. ¨ Frozen prepared meals, unless labeled low-sodium. ¨ Canned and dried soups, broths, and bouillon, unless labeled sodium-free or low-sodium. ¨ Canned vegetables, unless labeled sodium-free or low-sodium. ¨ [employer] fries, pizza, tacos, and other fast foods. ¨ Pickles, olives, ketchup, and other condiments, especially soy sauce, unless labeled sodium-free or low-sodium. Where can you learn more? Go to http://suzy-sofya.info/. Enter L354 in the search box to learn more about \"Low Sodium Diet (2,000 Milligram): Care Instructions. \"  Current as of: May 12, 2017  Content Version: 11.4  © 5563-6612 [employer]. Care instructions adapted under license by [employer] (which disclaims liability or warranty for this information). If you have questions about a medical condition or this instruction, always ask your healthcare professional. [place] [address] any warranty or liability for your use of this information.

## 2017-12-13 NOTE — MR AVS SNAPSHOT
Visit Information Date & Time Provider Department Dept. Phone Encounter #  
 12/13/2017  2:30 PM Kathy Mathew MD [address] [facility] [phone] [id number] Upcoming Health Maintenance Date Due  
 GLAUCOMA SCREENING Q2Y 6/27/1997 MEDICARE YEARLY EXAM 6/29/2017 Influenza Age 5 to Adult 8/1/2017 DTaP/Tdap/Td series (3 - Td) 12/12/2026 Allergies as of 12/13/2017  Review Complete On: 12/13/2017 By: Deborah Matthew LPN No Known Allergies Current Immunizations  Reviewed on 2/8/2017 Name Date Influenza High Dose Vaccine PF 11/3/2014 Influenza Vaccine 12/2/2015 Influenza Vaccine PF 12/19/2013 Influenza Vaccine Split 10/3/2012, 10/22/2011 Pneumococcal Conjugate (PCV-13) 8/22/2013 Td, Adsorbed PF 12/12/2016 Tdap 8/22/2013 Varicella Virus Vaccine 2/20/2014 Not reviewed this visit You Were Diagnosed With   
  
 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 
ICD-9-CM: 401.9 Vitals BP Pulse Temp Resp Height(growth percentile) Weight(growth percentile) 123/55 (BP 1 Location: Left arm, BP Patient Position: At rest) 64 96.1 °F (35.6 °C) (Oral) 14 5' 6\" (1.676 m) 130 lb 12.8 oz (59.3 kg) SpO2 BMI Smoking Status 98% 21.11 kg/m2 Never Smoker Vitals History BMI and BSA Data Body Mass Index Body Surface Area  
 21.11 kg/m 2 1.66 m 2 Preferred Pharmacy Pharmacy Name Phone [id number] [phone] [facility] [phone] Your Updated Medication List  
  
   
This list is accurate as of: 12/13/17  4:38 PM.  Always use your most recent med list. amLODIPine-Olmesartan 5-20 mg Tab Commonly known as:  MAGALIE Take 1 Tab by mouth daily. aspirin delayed-release 325 mg tablet TAKE 1 TABLET BY MOUTH EVERY DAY  
  
 aspirin, buffered 81 mg Tab Take  by mouth.  
  
 calcium-cholecalciferol (D3) tablet Commonly known as:  CALTRATE 600+D Take 1 Tab by mouth two (2) times a day. carvedilol 12.5 mg tablet Commonly known as:  COREG  
TAKE 1 TABLET BY MOUTH EVERY DAY  
  
 dexamethasone 1.5 mg tablet Commonly known as:  DECADRON  
3 tabs in am 3 tabs in pm first day, 3 tabs in am 2 tabs in pm second day, 2 tabs in am and 2 tabs in pm 3rd day, then 2 tabs in am and one tab in pm fourth day and 1 tab in am, and one tab in pm on the fifth day, then 1 tab in am on the six day ELIQUIS 2.5 mg tablet Generic drug:  apixaban TAKE 1 TABLET BY MOUTH TWICE A DAY  
  
 febuxostat 40 mg Tab tablet Commonly known as:  Milo Arrow Take 1 Tab by mouth daily. Indications: GOUT, GOUT PREVENTION  
  
 FISH OIL 1,000 mg Cap Generic drug:  omega-3 fatty acids-vitamin e Take 1 capsule by mouth daily. food supplemt, lactose-reduced Liqd Commonly known as:  ENSURE Take 237 mL by mouth four (4) times daily. hydrocortisone 2.5 % rectal cream  
Commonly known as:  ANUSOL-HC Insert  into rectum four (4) times daily. lactobacillus rhamnosus gg 10 billion cell 10 billion cell capsule Commonly known as:  PROBIOTIC Take 1 capsule by mouth daily. lisinopril 10 mg tablet Commonly known as:  PRINIVIL, ZESTRIL  
TAKE 1 TABLET BY MOUTH EVERY DAY  
  
 metoprolol tartrate 25 mg tablet Commonly known as:  LOPRESSOR Take 1 Tab by mouth every twelve (12) hours. mirtazapine 7.5 mg tablet Commonly known as:  Harrod Hoop Take 1 Tab by mouth nightly. * nystatin topical cream  
Commonly known as:  MYCOSTATIN Apply  to affected area two (2) times a day. * nystatin 100,000 unit/gram ointment Commonly known as:  MYCOSTATIN  
  
 omeprazole 40 mg capsule Commonly known as:  PRILOSEC  
 Take 1 capsule by mouth daily. oxyCODONE-acetaminophen 5-325 mg per tablet Commonly known as:  PERCOCET Take 1 Tab by mouth every eight (8) hours as needed for Pain. Max Daily Amount: 3 Tabs. pramoxine-hydrocortisone 1-1 % topical cream  
Commonly known as:  ANALPRAM HC Apply  to affected area three (3) times daily. pravastatin 40 mg tablet Commonly known as:  PRAVACHOL  
TAKE 1 TABLET BY MOUTH EVERY DAY  
  
 tamsulosin 0.4 mg capsule Commonly known as:  FLOMAX Take 2 Caps by mouth nightly. Indications: SYMPTOMATIC BENIGN PROSTATIC HYPERPLASIA  
  
 traMADol 50 mg tablet Commonly known as:  ULTRAM  
TAKE 1 TABLET BY MOUTH EVERY 6 TO 8 HOURS AS NEEDED FOR PAIN  
  
 trimethoprim-sulfamethoxazole 160-800 mg per tablet Commonly known as:  BACTRIM DS, SEPTRA DS  
TAKE 1 TABLET BY MOUTH EVERY 12 HOURS UNTIL FINISHED * Notice: This list has 2 medication(s) that are the same as other medications prescribed for you. Read the directions carefully, and ask your doctor or other care provider to review them with you. Prescriptions Printed Refills  
 food supplemt, lactose-reduced (ENSURE) liqd 1 Sig: Take 237 mL by mouth four (4) times daily. Class: Print Route: Oral  
 mirtazapine (REMERON) 7.5 mg tablet 1 Sig: Take 1 Tab by mouth nightly. Class: Print Route: Oral  
  
We Performed the Following AMB POC URINALYSIS DIP STICK AUTO W/O MICRO [45140 CPT(R)] CBC W/O DIFF [85164 CPT(R)] METABOLIC PANEL, COMPREHENSIVE [69421 CPT(R)] TSH 3RD GENERATION [73985 CPT(R)] Patient Instructions Anorexia: Care Instructions Your Care Instructions Anorexia is a type of eating disorder. People who have anorexia don't eat enough to stay at a normal weight. Sometimes they also exercise too much. They do these things because they're so afraid of gaining weight. They believe that they're fat, even when they're thin.  Often they think that losing even more weight will solve their problems and make their lives better. If you have anorexia, it can really harm your health. This is because your body doesn't get the nutrition it needs. The first step to controlling the problem is admitting that something is wrong. Then counseling can help you change how you think about food, the way you see your body, and any other emotional issues. It may take months or years, but you can recover from anorexia. Follow-up care is a key part of your treatment and safety. Be sure to make and go to all appointments, and call your doctor if you are having problems. It's also a good idea to know your test results and keep a list of the medicines you take. How can you care for yourself at home? Follow your treatment plan · Go to your counseling sessions. Call or talk with your counselor if you can't go or if you think the sessions aren't helping. Don't just stop going. · Take your medicines exactly as prescribed. Call your doctor if you think you are having a problem with your medicine. You will get more details on the specific medicines your doctor prescribes. Trust people who are helping you · Listen to what counselors and nutrition experts say about healthy eating. · Learn about what is included in a balanced diet. Then discuss what you learn. · Let people know how you are feeling. Listen to how they are feeling too. · Accept support and feedback from other people. Learn to be easier on yourself · Learn to focus on your good qualities. · If your body feels weak, slow down and do less. · Remind yourself that feeling bad about yourself is all part of your disorder. · Remember that it takes time to recover from anorexia. · Spend time with other people doing things you enjoy. · Try to focus on one goal at a time. · Don't blame yourself for your disorder. Develop healthy eating habits · With your doctor and counselor, you will decide on a good weight for you. You will also decide how much weight you will gain each week. · Try not to argue with the people you eat with. Arguing increases stress. And stress can make make it harder for you to relax and eat. · Talk with other people during meals about things that interest you. Don't talk about food or gaining weight. Caring for a loved one with anorexia · Remind yourself that anorexia is a long-lasting disorder. It takes time for changes to occur. · Show love and support for your loved one, especially if he or she gets discouraged. · Support your loved one as he or she goes through the steps of recovery. Focus on wellness. Don't focus on food. · Take care of yourself. Continue with your own interests, career, hobbies, and friends. · Don't blame yourself or look for the reason for the disorder. · If you are having a hard time, talk with a counselor. · Learn what to do if your loved one relapses. When should you call for help? Call 911 anytime you think you may need emergency care. For example, call if: · A person with anorexia seems depressed and is talking about suicide. If the talk about suicide seems real, stay with the person, or ask someone you trust to stay with the person, until you get emergency help. · You have a rapid or irregular heartbeat. · You passed out (lost consciousness). Call your doctor now or seek immediate medical care if: 
· You feel hopeless or have thoughts of hurting yourself. Watch closely for changes in your health, and be sure to contact your doctor if: 
· You have trouble sleeping. · You feel anxious or depressed. Where can you learn more? Go to http://suzy-sofya.info/. Enter Z723 in the search box to learn more about \"Anorexia: Care Instructions. \" Current as of: May 12, 2017 Content Version: 11.4 © 0642-0231 [employer], [employer].  Care instructions adapted under license by [employer] (which disclaims liability or warranty for this information). If you have questions about a medical condition or this instruction, always ask your healthcare professional. [address] any warranty or liability for your use of this information. Low Sodium Diet (2,000 Milligram): Care Instructions Your Care Instructions Too much sodium causes your body to hold on to extra water. This can raise your blood pressure and force your heart and kidneys to work harder. In very serious cases, this could cause you to be put in the hospital. It might even be life-threatening. By limiting sodium, you will feel better and lower your risk of serious problems. The most common source of sodium is salt. People get most of the salt in their diet from canned, prepared, and packaged foods. Fast food and restaurant meals also are very high in sodium. Your doctor will probably limit your sodium to less than 2,000 milligrams (mg) a day. This limit counts all the sodium in prepared and packaged foods and any salt you add to your food. Follow-up care is a key part of your treatment and safety. Be sure to make and go to all appointments, and call your doctor if you are having problems. It's also a good idea to know your test results and keep a list of the medicines you take. How can you care for yourself at home? Read food labels · Read labels on cans and food packages. The labels tell you how much sodium is in each serving. Make sure that you look at the serving size. If you eat more than the serving size, you have eaten more sodium. · Food labels also tell you the Percent Daily Value for sodium. Choose products with low Percent Daily Values for sodium. · Be aware that sodium can come in forms other than salt, including monosodium glutamate (MSG), sodium citrate, and sodium bicarbonate (baking soda). MSG is often added to Asian food. When you eat out, you can sometimes ask for food without MSG or added salt. Buy low-sodium foods · Buy foods that are labeled \"unsalted\" (no salt added), \"sodium-free\" (less than 5 mg of sodium per serving), or \"low-sodium\" (less than 140 mg of sodium per serving). Foods labeled \"reduced-sodium\" and \"light sodium\" may still have too much sodium. Be sure to read the label to see how much sodium you are getting. · Buy fresh vegetables, or frozen vegetables without added sauces. Buy low-sodium versions of canned vegetables, soups, and other canned goods. Prepare low-sodium meals · Cut back on the amount of salt you use in cooking. This will help you adjust to the taste. Do not add salt after cooking. One teaspoon of salt has about 2,300 mg of sodium. · Take the salt shaker off the table. · Flavor your food with garlic, lemon juice, onion, vinegar, herbs, and spices. Do not use soy sauce, lite soy sauce, steak sauce, onion salt, garlic salt, celery salt, mustard, or ketchup on your food. · Use low-sodium salad dressings, sauces, and ketchup. Or make your own salad dressings and sauces without adding salt. · Use less salt (or none) when recipes call for it. You can often use half the salt a recipe calls for without losing flavor. Other foods such as rice, pasta, and grains do not need added salt. · Rinse canned vegetables, and cook them in fresh water. This removes some-but not all-of the salt. · Avoid water that is naturally high in sodium or that has been treated with water softeners, which add sodium. Call your local water company to find out the sodium content of your water supply. If you buy bottled water, read the label and choose a sodium-free brand. Avoid high-sodium foods · Avoid eating: ¨ Smoked, cured, salted, and canned meat, fish, and poultry. ¨ Ham, sharif, hot dogs, and luncheon meats. ¨ Regular, hard, and processed cheese and regular peanut butter. ¨ Crackers with salted tops, and other salted snack foods such as pretzels, chips, and salted popcorn. ¨ Frozen prepared meals, unless labeled low-sodium. ¨ Canned and dried soups, broths, and bouillon, unless labeled sodium-free or low-sodium. ¨ Canned vegetables, unless labeled sodium-free or low-sodium. ¨ Western Marti fries, pizza, tacos, and other fast foods. ¨ Pickles, olives, ketchup, and other condiments, especially soy sauce, unless labeled sodium-free or low-sodium. Where can you learn more? Go to http://suzy-sofya.info/. Enter W457 in the search box to learn more about \"Low Sodium Diet (2,000 Milligram): Care Instructions. \" Current as of: May 12, 2017 Content Version: 11.4 © 0742-5654 [employer]. Care instructions adapted under license by [facility] (which disclaims liability or warranty for this information). If you have questions about a medical condition or this instruction, always ask your healthcare professional. [place] [address] any warranty or liability for your use of this information. Introducing [facility]! Dear Leola Cabral: Thank you for requesting a [facility] account. Our records indicate that you have previously registered for a [facility] account but its currently inactive. Please call our [facility] support line at [phone]. Additional Information If you have questions, please visit the Frequently Asked Questions section of the [employer] website at [email or web address] Remember, [employer] is NOT to be used for urgent needs. For medical emergencies, dial 911. Now available from your iPhone and Android! Please provide this summary of care documentation to your next provider. Your primary care clinician is listed as Rhea Garcia. If you have any questions after today's visit, please call [phone].

## 2017-12-13 NOTE — PROGRESS NOTES
Name and  verified      Chief Complaint   Patient presents with    Decreased Appetite     X 2 months         Health Maintenance reviewed-discussed with patient.

## 2018-01-01 ENCOUNTER — OFFICE VISIT (OUTPATIENT)
Dept: FAMILY MEDICINE CLINIC | Age: 83
End: 2018-01-01

## 2018-01-01 VITALS
OXYGEN SATURATION: 98 % | SYSTOLIC BLOOD PRESSURE: 102 MMHG | BODY MASS INDEX: 22.26 KG/M2 | DIASTOLIC BLOOD PRESSURE: 55 MMHG | TEMPERATURE: 98.3 F | HEIGHT: 66 IN | WEIGHT: 138.5 LBS | HEART RATE: 69 BPM | RESPIRATION RATE: 16 BRPM

## 2018-01-01 VITALS
BODY MASS INDEX: 21.79 KG/M2 | WEIGHT: 135.6 LBS | HEART RATE: 86 BPM | HEIGHT: 66 IN | RESPIRATION RATE: 16 BRPM | OXYGEN SATURATION: 98 % | DIASTOLIC BLOOD PRESSURE: 73 MMHG | SYSTOLIC BLOOD PRESSURE: 126 MMHG | TEMPERATURE: 97.3 F

## 2018-01-01 DIAGNOSIS — H54.7 VISION DECREASED: Primary | ICD-10-CM

## 2018-01-01 DIAGNOSIS — R63.0 DECREASED APPETITE: ICD-10-CM

## 2018-01-01 DIAGNOSIS — I10 ESSENTIAL HYPERTENSION: ICD-10-CM

## 2018-01-01 DIAGNOSIS — M10.00 IDIOPATHIC GOUT, UNSPECIFIED CHRONICITY, UNSPECIFIED SITE: Primary | ICD-10-CM

## 2018-01-01 DIAGNOSIS — I69.322 CVA, OLD, DYSARTHRIA: Primary | ICD-10-CM

## 2018-01-01 DIAGNOSIS — M10.9 GOUT OF BIG TOE: ICD-10-CM

## 2018-01-01 LAB
ALBUMIN SERPL-MCNC: 4.4 G/DL (ref 3.5–4.7)
ALBUMIN/GLOB SERPL: 1.4 {RATIO} (ref 1.2–2.2)
ALP SERPL-CCNC: 66 IU/L (ref 39–117)
ALT SERPL-CCNC: 12 IU/L (ref 0–44)
AST SERPL-CCNC: 20 IU/L (ref 0–40)
BILIRUB SERPL-MCNC: 0.7 MG/DL (ref 0–1.2)
BILIRUB UR QL STRIP: NEGATIVE
BUN SERPL-MCNC: 18 MG/DL (ref 8–27)
BUN/CREAT SERPL: 14 (ref 10–24)
CALCIUM SERPL-MCNC: 9.5 MG/DL (ref 8.6–10.2)
CHLORIDE SERPL-SCNC: 99 MMOL/L (ref 96–106)
CHOLEST SERPL-MCNC: 136 MG/DL (ref 100–199)
CO2 SERPL-SCNC: 26 MMOL/L (ref 20–29)
CREAT SERPL-MCNC: 1.26 MG/DL (ref 0.76–1.27)
ERYTHROCYTE [DISTWIDTH] IN BLOOD BY AUTOMATED COUNT: 15.8 % (ref 12.3–15.4)
GLOBULIN SER CALC-MCNC: 3.2 G/DL (ref 1.5–4.5)
GLUCOSE SERPL-MCNC: 121 MG/DL (ref 65–99)
GLUCOSE UR-MCNC: NEGATIVE MG/DL
HCT VFR BLD AUTO: 37.6 % (ref 37.5–51)
HDLC SERPL-MCNC: 56 MG/DL
HGB BLD-MCNC: 12.2 G/DL (ref 13–17.7)
INTERPRETATION: NORMAL
KETONES P FAST UR STRIP-MCNC: NEGATIVE MG/DL
MCH RBC QN AUTO: 28.4 PG (ref 26.6–33)
MCHC RBC AUTO-ENTMCNC: 32.4 G/DL (ref 31.5–35.7)
MCV RBC AUTO: 88 FL (ref 79–97)
PH UR STRIP: 5 [PH] (ref 4.6–8)
PLATELET # BLD AUTO: 232 X10E3/UL (ref 150–379)
POTASSIUM SERPL-SCNC: 4.1 MMOL/L (ref 3.5–5.2)
PROT SERPL-MCNC: 7.6 G/DL (ref 6–8.5)
PROT UR QL STRIP: NEGATIVE
RBC # BLD AUTO: 4.29 X10E6/UL (ref 4.14–5.8)
SODIUM SERPL-SCNC: 142 MMOL/L (ref 134–144)
SP GR UR STRIP: 1.01 (ref 1–1.03)
UA UROBILINOGEN AMB POC: NORMAL (ref 0.2–1)
URATE SERPL-MCNC: 7.8 MG/DL (ref 3.7–8.6)
URINALYSIS CLARITY POC: CLEAR
URINALYSIS COLOR POC: YELLOW
URINE BLOOD POC: NEGATIVE
URINE LEUKOCYTES POC: NEGATIVE
URINE NITRITES POC: NEGATIVE
WBC # BLD AUTO: 7.4 X10E3/UL (ref 3.4–10.8)

## 2018-01-01 RX ORDER — MIRTAZAPINE 7.5 MG/1
7.5 TABLET, FILM COATED ORAL
Qty: 90 TAB | Refills: 0 | Status: SHIPPED | OUTPATIENT
Start: 2018-01-01 | End: 2018-01-01 | Stop reason: ALTCHOICE

## 2018-01-01 RX ORDER — CARVEDILOL 3.12 MG/1
3.12 TABLET ORAL 2 TIMES DAILY WITH MEALS
Qty: 60 TAB | Refills: 6 | Status: SHIPPED | OUTPATIENT
Start: 2018-01-01

## 2018-01-01 RX ORDER — COLCHICINE 0.6 MG/1
0.6 TABLET ORAL DAILY
COMMUNITY
End: 2018-01-01 | Stop reason: ALTCHOICE

## 2018-01-01 RX ORDER — CLOPIDOGREL BISULFATE 75 MG/1
75 TABLET ORAL DAILY
Qty: 30 TAB | Refills: 6 | Status: SHIPPED | OUTPATIENT
Start: 2018-01-01 | End: 2018-01-01 | Stop reason: SDUPTHER

## 2018-01-01 RX ORDER — SENNOSIDES 8.6 MG/1
TABLET ORAL
COMMUNITY
Start: 2018-01-01 | End: 2018-01-01 | Stop reason: ALTCHOICE

## 2018-01-01 RX ORDER — URSODIOL 300 MG/1
300 CAPSULE ORAL
COMMUNITY
Start: 2018-01-01 | End: 2018-01-01 | Stop reason: ALTCHOICE

## 2018-01-01 RX ORDER — FUROSEMIDE 20 MG/1
10 TABLET ORAL DAILY
Qty: 30 TAB | Refills: 6 | Status: SHIPPED | OUTPATIENT
Start: 2018-01-01

## 2018-01-01 RX ORDER — POTASSIUM CHLORIDE 1500 MG/1
TABLET, FILM COATED, EXTENDED RELEASE ORAL
Qty: 30 TAB | Refills: 0 | Status: SHIPPED | OUTPATIENT
Start: 2018-01-01

## 2018-01-01 RX ORDER — LISINOPRIL 5 MG/1
5 TABLET ORAL DAILY
Qty: 30 TAB | Refills: 6 | Status: SHIPPED | OUTPATIENT
Start: 2018-01-01

## 2018-01-01 RX ORDER — MIRTAZAPINE 7.5 MG/1
7.5 TABLET, FILM COATED ORAL
Qty: 90 TAB | Refills: 0 | Status: SHIPPED | OUTPATIENT
Start: 2018-01-01 | End: 2018-01-01 | Stop reason: SDUPTHER

## 2018-01-01 RX ORDER — ROSUVASTATIN CALCIUM 10 MG/1
10 TABLET, COATED ORAL
Qty: 30 TAB | Refills: 6 | Status: SHIPPED | OUTPATIENT
Start: 2018-01-01

## 2018-01-01 RX ORDER — FUROSEMIDE 20 MG/1
TABLET ORAL
COMMUNITY
Start: 2018-01-01 | End: 2018-01-01 | Stop reason: SDUPTHER

## 2018-01-01 RX ORDER — POTASSIUM CHLORIDE 20 MEQ/1
20 TABLET, EXTENDED RELEASE ORAL
COMMUNITY
Start: 2018-01-01

## 2018-01-01 RX ORDER — ALLOPURINOL 100 MG/1
100 TABLET ORAL DAILY
Qty: 30 TAB | Refills: 7 | Status: SHIPPED | OUTPATIENT
Start: 2018-01-01 | End: 2018-01-01 | Stop reason: SDUPTHER

## 2018-01-01 RX ORDER — ASPIRIN 81 MG/1
TABLET ORAL
Qty: 30 TAB | Refills: 0 | Status: SHIPPED | OUTPATIENT
Start: 2018-01-01

## 2018-01-01 RX ORDER — MULTIVITAMIN
1 TABLET ORAL 2 TIMES DAILY
Qty: 60 TAB | Refills: 11 | Status: SHIPPED | OUTPATIENT
Start: 2018-01-01

## 2018-01-01 RX ORDER — CLOPIDOGREL BISULFATE 75 MG/1
TABLET ORAL
COMMUNITY
Start: 2018-01-01 | End: 2018-01-01 | Stop reason: SDUPTHER

## 2018-01-01 RX ORDER — DOCUSATE SODIUM 100 MG/1
100 CAPSULE, LIQUID FILLED ORAL
Qty: 90 CAP | Refills: 3 | Status: SHIPPED | OUTPATIENT
Start: 2018-01-01 | End: 2018-12-11

## 2018-01-01 RX ORDER — METHYLPREDNISOLONE 4 MG/1
TABLET ORAL
Qty: 1 DOSE PACK | Refills: 0 | Status: SHIPPED | OUTPATIENT
Start: 2018-01-01 | End: 2018-01-01 | Stop reason: ALTCHOICE

## 2018-01-01 RX ORDER — ALLOPURINOL 100 MG/1
100 TABLET ORAL DAILY
Qty: 30 TAB | Refills: 7 | Status: SHIPPED | OUTPATIENT
Start: 2018-01-01

## 2018-01-01 RX ORDER — MIRTAZAPINE 7.5 MG/1
7.5 TABLET, FILM COATED ORAL
Qty: 30 TAB | Refills: 1 | Status: SHIPPED | OUTPATIENT
Start: 2018-01-01 | End: 2018-01-01 | Stop reason: SDUPTHER

## 2018-01-01 RX ORDER — CLOPIDOGREL BISULFATE 75 MG/1
TABLET ORAL
Qty: 30 TAB | Refills: 0 | Status: SHIPPED | OUTPATIENT
Start: 2018-01-01

## 2018-06-14 NOTE — PATIENT INSTRUCTIONS
Gout: Care Instructions  Your Care Instructions    Gout is a form of arthritis caused by a buildup of uric acid crystals in a joint. It causes sudden attacks of pain, swelling, redness, and stiffness, usually in one joint, especially the big toe. Gout usually comes on without a cause. But it can be brought on by drinking alcohol (especially beer) or eating seafood and red meat. Taking certain medicines, such as diuretics or aspirin, also can bring on an attack of gout. Taking your medicines as prescribed and following up with your doctor regularly can help you avoid gout attacks in the future. Follow-up care is a key part of your treatment and safety. Be sure to make and go to all appointments, and call your doctor if you are having problems. It's also a good idea to know your test results and keep a list of the medicines you take. How can you care for yourself at home? · If the joint is swollen, put ice or a cold pack on the area for 10 to 20 minutes at a time. Put a thin cloth between the ice and your skin. · Prop up the sore limb on a pillow when you ice it or anytime you sit or lie down during the next 3 days. Try to keep it above the level of your heart. This will help reduce swelling. · Rest sore joints. Avoid activities that put weight or strain on the joints for a few days. Take short rest breaks from your regular activities during the day. · Take your medicines exactly as prescribed. Call your doctor if you think you are having a problem with your medicine. · Take pain medicines exactly as directed. ¨ If the doctor gave you a prescription medicine for pain, take it as prescribed. ¨ If you are not taking a prescription pain medicine, ask your doctor if you can take an over-the-counter medicine. · Eat less seafood and red meat. · Check with your doctor before drinking alcohol. · Losing weight, if you are overweight, may help reduce attacks of gout. But do not go on a [employer]. \" Losing a lot of weight in a short amount of time can cause a gout attack. When should you call for help? Call your doctor now or seek immediate medical care if:  ? · You have a fever. ? · The joint is so painful you cannot use it. ? · You have sudden, unexplained swelling, redness, warmth, or severe pain in one or more joints. ? Watch closely for changes in your health, and be sure to contact your doctor if:  ? · You have joint pain. ? · Your symptoms get worse or are not improving after 2 or 3 days. Where can you learn more? Go to http://suzy-sofya.info/. Enter O806 in the search box to learn more about \"Gout: Care Instructions. \"  Current as of: October 31, 2016  Content Version: 11.4  © 1560-2305 [employer]. Care instructions adapted under license by [employer] (which disclaims liability or warranty for this information). If you have questions about a medical condition or this instruction, always ask your healthcare professional. [address] any warranty or liability for your use of this information.
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
## 2018-06-14 NOTE — PROGRESS NOTES
Chief Complaint   Patient presents with    Knee Pain     1 month of left knee Swelling & Pain    Foot Pain     1 month of right Foot Swelling & Pain   1. Have you been to the ER, urgent care clinic since your last visit? Hospitalized since your last visit? Yes When: 5/15/18 [facility] ED pain and swelling of feet    2. Have you seen or consulted any other health care providers outside of the [address] [place] [address] since your last visit? Include any pap smears or colon screening.  No

## 2018-06-14 NOTE — MR AVS SNAPSHOT
[address] [facility] [address] 
[phone] Patient: Bard Mendoza MRN: [id number] KIMBERLEY:[birth year] Visit Information Date & Time Provider Department Dept. Phone Encounter #  
 6/14/2018  2:00 PM Serena Osler, MD [facility] OFFICE-ANNEX [phone] [id number] Follow-up Instructions Return in about 2 months (around 8/14/2018), or if symptoms worsen or fail to improve. Upcoming Health Maintenance Date Due  
 GLAUCOMA SCREENING Q2Y 6/27/1997 MEDICARE YEARLY EXAM 3/14/2018 Influenza Age 5 to Adult 8/1/2018 DTaP/Tdap/Td series (3 - Td) 12/12/2026 Allergies as of 6/14/2018  Review Complete On: 6/14/2018 By: Van Kuhn No Known Allergies Current Immunizations  Reviewed on 2/8/2017 Name Date Influenza High Dose Vaccine PF 11/3/2014 Influenza Vaccine 12/2/2015 Influenza Vaccine PF 12/19/2013 Influenza Vaccine Split 10/3/2012, 10/22/2011 Pneumococcal Conjugate (PCV-13) 8/22/2013 Td, Adsorbed PF 12/12/2016 Tdap 8/22/2013 Varicella Virus Vaccine 2/20/2014 Not reviewed this visit You Were Diagnosed With   
  
 Codes Comments Idiopathic gout, unspecified chronicity, unspecified site    -  Primary ICD-10-CM: M10.00 ICD-9-CM: 274.9 Gout of big toe     ICD-10-CM: M10.9 ICD-9-CM: 274.01 Vitals BP Pulse Temp Resp Height(growth percentile) Weight(growth percentile) 126/73 (BP 1 Location: Left arm, BP Patient Position: Sitting) 86 97.3 °F (36.3 °C) (Oral) 16 5' 6\" (1.676 m) 135 lb 9.6 oz (61.5 kg) SpO2 BMI Smoking Status 98% 21.89 kg/m2 Never Smoker Vitals History BMI and BSA Data Body Mass Index Body Surface Area  
 21.89 kg/m 2 1.69 m 2 Preferred Pharmacy Pharmacy Name Phone [phone] [phone] Your Updated Medication List  
  
   
This list is accurate as of 6/14/18  2:55 PM.  Always use your most recent med list.  
  
  
  
  
 allopurinol 100 mg tablet Commonly known as:  Antonio Merles Take 1 Tab by mouth daily. aspirin delayed-release 325 mg tablet TAKE 1 TABLET BY MOUTH EVERY DAY  
  
 calcium-cholecalciferol (D3) tablet Commonly known as:  CALTRATE 600+D Take 1 Tab by mouth two (2) times a day. carvedilol 12.5 mg tablet Commonly known as:  COREG  
TAKE 1 TABLET BY MOUTH EVERY DAY  
  
 colchicine 0.6 mg tablet Take 0.6 mg by mouth daily. ELIQUIS 2.5 mg tablet Generic drug:  apixaban TAKE 1 TABLET BY MOUTH TWICE A DAY  
  
 lisinopril 10 mg tablet Commonly known as:  PRINIVIL, ZESTRIL  
TAKE 1 TABLET BY MOUTH EVERY DAY  
  
 methylPREDNISolone 4 mg tablet Commonly known as:  Blinda Creed As directed for 6 days one package  
  
 mirtazapine 7.5 mg tablet Commonly known as:  Maria Esther Miami Take 1 Tab by mouth nightly. Patient requires an office visit for additional refills. pravastatin 40 mg tablet Commonly known as:  PRAVACHOL  
TAKE 1 TABLET BY MOUTH EVERY DAY  
  
 traMADol 50 mg tablet Commonly known as:  ULTRAM  
TAKE 1 TABLET BY MOUTH EVERY 6 TO 8 HOURS AS NEEDED FOR PAIN Prescriptions Printed Refills  
 methylPREDNISolone (MEDROL DOSEPACK) 4 mg tablet 0 Sig: As directed for 6 days one package Class: Print  
 allopurinol (ZYLOPRIM) 100 mg tablet 7 Sig: Take 1 Tab by mouth daily. Class: Print Route: Oral  
  
Follow-up Instructions Return in about 2 months (around 8/14/2018), or if symptoms worsen or fail to improve. Introducing [facility] & HEALTH SERVICES! Dear [employer]: Thank you for requesting a Everyone Counts account. Our records indicate that you have previously registered for a Everyone Counts account but its currently inactive. Please call our Everyone Counts support line at [phone]. Additional Information If you have questions, please visit the Frequently Asked Questions section of the [employer] website at [email or web address] Remember, [employer] is NOT to be used for urgent needs. For medical emergencies, dial 911. Now available from your iPhone and Android! Please provide this summary of care documentation to your next provider. Your primary care clinician is listed as Tyrone Burgos. If you have any questions after today's visit, please call [phone].

## 2018-08-06 NOTE — TELEPHONE ENCOUNTER
Last Visit: 6/14-Candy  Next Appt: 8/28-Candy  Previous Refill Encounter: 4/30-90+0    Requested Prescriptions     Pending Prescriptions Disp Refills    mirtazapine (REMERON) 7.5 mg tablet 90 Tab 0     Sig: Take 1 Tab by mouth nightly. Patient requires an office visit for additional refills.

## 2018-09-12 NOTE — PROGRESS NOTES
Chief Complaint   Patient presents with   [facility] Follow Up    1. Have you been to the ER, urgent care clinic since your last visit? Hospitalized since your last visit? Aug 20 Hosptialized    2. Have you seen or consulted any other health care providers outside of the [facility] since your last visit? Include any pap smears or colon screening.  No

## 2018-09-12 NOTE — MR AVS SNAPSHOT
[address] [facility] [address] [address] 
[phone] Patient: Louie Turner MRN: [id number] AQC:[birth year] Visit Information Date & Time Provider Department Dept. Phone Encounter #  
 9/12/2018  3:00 PM Spencer Kumar MD [address] [facility] OFFICE-ANNEX [phone] [id number] Upcoming Health Maintenance Date Due  
 GLAUCOMA SCREENING Q2Y 6/27/1997 MEDICARE YEARLY EXAM 3/14/2018 Influenza Age 5 to Adult 8/1/2018 DTaP/Tdap/Td series (3 - Td) 12/12/2026 Allergies as of 9/12/2018  Review Complete On: 9/12/2018 By: Spenecr Kumar MD  
 No Known Allergies Current Immunizations  Reviewed on 2/8/2017 Name Date Influenza High Dose Vaccine PF 11/3/2014 Influenza Vaccine 12/2/2015 Influenza Vaccine PF 12/19/2013 Influenza Vaccine Split 10/3/2012, 10/22/2011 Pneumococcal Conjugate (PCV-13) 8/22/2013 Td, Adsorbed PF 12/12/2016 Tdap 8/22/2013 Varicella Virus Vaccine 2/20/2014 Not reviewed this visit You Were Diagnosed With   
  
 Codes Comments CVA, old, dysarthria    -  Primary ICD-10-CM: A90.275 ICD-9-CM: 438.13 Essential hypertension     ICD-10-CM: I10 
ICD-9-CM: 401.9 Vitals BP Pulse Temp Resp Height(growth percentile) Weight(growth percentile) 102/55 (BP 1 Location: Left arm, BP Patient Position: Sitting) 69 98.3 °F (36.8 °C) (Oral) 16 5' 6\" (1.676 m) 138 lb 8 oz (62.8 kg) SpO2 BMI Smoking Status 98% 22.35 kg/m2 Never Smoker BMI and BSA Data Body Mass Index Body Surface Area  
 22.35 kg/m 2 1.71 m 2 [facility]erred Pharmacy [facility] Name Phone [phone] [facility] [phone] Your Updated Medication List  
  
   
This list is accurate as of 9/12/18  4:12 PM.  Always use your most recent med list.  
  
  
  
  
 allopurinol 100 mg tablet Commonly known as:  Ollen Epley Take 1 Tab by mouth daily. Indications: CALCIUM RENAL CALCULI PREVENTION, GOUT  
  
 calcium-cholecalciferol (D3) tablet Commonly known as:  CALTRATE 600+D Take 1 Tab by mouth two (2) times a day. carvedilol 3.125 mg tablet Commonly known as:  Choudhary Servant Take 1 Tab by mouth two (2) times daily (with meals). Indications: chronic heart failure  
  
 clopidogrel 75 mg Tab Commonly known as:  PLAVIX Take 1 Tab by mouth daily. Indications: blood thinner  
  
 docusate sodium 100 mg capsule Commonly known as:  Vermell Nones Take 1 Cap by mouth three (3) times daily as needed for Constipation for up to 90 days. With each meal and Hold for loose stool  
  
 furosemide 20 mg tablet Commonly known as:  LASIX Take 0.5 Tabs by mouth daily. For legs swelling once daily  
  
 lisinopril 5 mg tablet Commonly known as:  Derek Shutters Take 1 Tab by mouth daily. Indications: hypertension  
  
 potassium chloride 20 mEq tablet Commonly known as:  K-DUR, KLOR-CON 20 mEq.  
  
 rosuvastatin 10 mg tablet Commonly known as:  CRESTOR Take 1 Tab by mouth nightly. Indications: mixed hyperlipidemia Prescriptions Printed Refills  
 furosemide (LASIX) 20 mg tablet 6 Sig: Take 0.5 Tabs by mouth daily. For legs swelling once daily Class: Print Route: Oral  
 docusate sodium (COLACE) 100 mg capsule 3 Sig: Take 1 Cap by mouth three (3) times daily as needed for Constipation for up to 90 days. With each meal and Hold for loose stool Class: Print Route: Oral  
 rosuvastatin (CRESTOR) 10 mg tablet 6 Sig: Take 1 Tab by mouth nightly. Indications: mixed hyperlipidemia Class: Print Route: Oral  
 clopidogrel (PLAVIX) 75 mg tab 6 Sig: Take 1 Tab by mouth daily. Indications: blood thinner Class: Print Route: Oral  
 calcium-cholecalciferol, D3, (CALTRATE 600+D) tablet 11 Sig: Take 1 Tab by mouth two (2) times a day. Class: Print Route: Oral  
 allopurinol (ZYLOPRIM) 100 mg tablet 7 Sig: Take 1 Tab by mouth daily. Indications: CALCIUM RENAL CALCULI PREVENTION, GOUT Class: Print Route: Oral  
 carvedilol (COREG) 3.125 mg tablet 6 Sig: Take 1 Tab by mouth two (2) times daily (with meals). Indications: chronic heart failure Class: Print Route: Oral  
 lisinopril (PRINIVIL, ZESTRIL) 5 mg tablet 6 Sig: Take 1 Tab by mouth daily. Indications: hypertension Class: Print Route: Oral  
  
We Performed the Following AMB POC URINALYSIS DIP STICK AUTO W/O MICRO [83305 CPT(R)] CBC W/O DIFF [05340 CPT(R)] CHOLESTEROL, TOTAL [51493 CPT(R)] HDL CHOLESTEROL [96141 CPT(R)] METABOLIC PANEL, COMPREHENSIVE [37514 CPT(R)] URIC ACID [id number] CPT(R)] Introducing [facility] & HEALTH SERVICES! Dear Leola Cabral: Thank you for requesting a [employer] account. Our records indicate that you have previously registered for a [employer] account but its currently inactive. Please call our [employer] support line at [phone]. Additional Information If you have questions, please visit the Frequently Asked Questions section of the [employer] website at [email or web address] Remember, [employer] is NOT to be used for urgent needs. For medical emergencies, dial 911. Now available from your iPhone and Android! Please provide this summary of care documentation to your next provider. Your primary care clinician is listed as Rhea Garcia. If you have any questions after today's visit, please call [phone].
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
## 2018-09-29 NOTE — PATIENT INSTRUCTIONS
???: ?? ?? - [ Stroke: Care Instructions ]  ?? ??    ???? ??????. ?, ??? ??? ??? ???? ??? ?? ?? ????? ?? ?? ?? ??? ?? ??? ?????? ?????. ?? ??? ???? ???? ?? ??? ?? ???? ??? ? ? ?? ????.  ?? ?? ????? ????? ??? ??? ?? ??? ?????. ??? ??? ???? ?? ??? ???????. ??? ?? ?? ??? ??? ??? ?? ??? ??? ? ????. ?? ??? ??? ???? ????.  ??? ?? ??? ???? ??? ???? ?? ??? ? ? ??? ???? ????. ???, ? ????? ? ??? ?? ?? ???? ?? ?????. ??? ?? ? ???? ????? ??? ??? ???? ??? ?? ???? ??????. ???? ?? ?? ????? ???, ?? ? ??? ?? ?? ?? ?????.  ???? ????? ?? ??? ?? ???? ?? ?? ? ? ????. ??? ???? ?? ?? ?????? ?? ?????. ?? ? ?? ??? ????? ?? ?? ????.  ??? ???? ?? ??? ??? ??? ???? ???. ?? ?? ??? ???? ??, ??? ??? ?? ???? ??????. ?? ??? ?? ?? ?? ?? ?? ??? ??? ???? ?? ?? ?????.  ???? ??? ??? ? ?????  · ?? ??? ???? ???? ??? ?? ????? ??????. ???, ??? ? ??? ???? ??, ???, ?? ? ?? ???? ?? ??? ??? ? ? ????.  · ?? ??? ??? ???? ???? ????.  · ???? ??? ?? ???? ????? ??? ???? ?? ??????. ??? ??? ???? ?? ???? ?????.  · ???? ???? ??, ??? ?? ??? ??? ???? ???? ??? ???? ????. ??, ?? ?? ?? ?? ??? ?????.  · ??? ???? ??, ??? ??? ???? ??? ?? ????? ??? ???? ?????. ??, ?? ?? ? ??? ??? ?? ???? ???? ?? ???? ?? ???? ??? ?? ?? ??? ??????. ???? ????? ???? ??? ?? ?????. ??? ???? ??, ?? ??? Metamucil? ?? ???, ???, ??? ?? ???? ??? ? ????.  ??  · ??? ??? ?? ??? ??? ??? ??????. ??? ??? ???? ??? ??? ???? ?? ???? ??????. ? ?? ??? ????? ? ? ????. ACE(?????????) ???, ????? II ??? ???(ARB), ?? ???, ???(???), ??? ?? ?? ??? ?? ??? ??? ??? ? ?? ?????. ???(Statin)?  ?????? ???? ??? ???. ?? ??? ???, ??, ?? ??, ?? ?? ?? ??? ??? ??? ??? ?? ????.  · ?? ??? ?? ???? ??? ?? ?? ???? ??? ??, ??? ???? ???? ??? ?? ?? ??? ???? ???. ?? ?? ???? ??? ?? ??? ??? ? ????.  · ??? ?? ???? ?? ??? ?? ?? ?? ?? ?? ???? ????.  · ????? ??? ???? ???? ?? ??, ??? ??? ???? ?? ??? ??????.  ?? ? ???? ?? ??  · ??? ??? ???? ?????. ??? ??? ??? ??? ??? ?? ?? ????. ??? ?? ?? ??? ????. ??? ??? ? ?? ??? ??? ?????. ??? ?? ??? ??????. ??? ???? ???? ??? ??????.  · ??? ??? ? ? ?? ?? ??? ???? ??????. ??? ??? ? ? ?? ?? ?? ???? ?? ????. ??? ??? ?? ? ??? ??????.  · ??? ?? ???? ??????. ?? ??? ?? ??? ?? ?? ?? ? ?? ??? ?????. ???? ???? ??? ?? ????? ????. ??? ??? ????.  · ??? ? ?????. ??? ?? ?? ??? ?? ????. ?? ???? ??? ??????. ??? ???, ??? ??? ???, ???? ?? ?? ? ?? ??? ?????. ??? ?? ?? ???? ? ???? ????? ??? ??????. ??? ?? ??????. ??? ?? ??? ??????. ??? ?? ?? ?? ???? ?? ?? ??? ??? ? ?? ??? ??? ??????.  ?? ??? ???? ????  ?? ??? ????? ????? ???? 911? ??????. ?? ??? ?? ?? ??? ????.  · ??? ??? ??? ?? ??. ???? ?? ??? ??? ? ????.  ¨ ?? ? ? ?? ??, ? ?? ??? ??, ??, ?? ?? ?? ?? ??? ??? ???? ??.  ¨ ??? ??? ??? ??.  ¨ ??? ??? ??? ??.  ¨ ??? ???? ????? ??? ??? ???? ??? ??.  ¨ ??? ?? ?? ?? ??? ??? ?? ??.  ¨ ?? ???? ??? ??? ??? ??? ???? ??.  ??? ??? ? ? ?? ?????? 911?? ??????.  ?? ???? ?? ???? ????? ???? ?? ??? ?????.  · ???? ??? ??? ??? ?? ???? ??? ??? ??? ??? ? ????.  ???? ?? ??? ?? ?? ????, ??? ??? ??? ????? ??????.  ??? ??? ?? ? ?? ???  ?? [email or web address] ?? [email or web address] ?? ???? ??? ?? ?? \"???: ?? ?? - [ Stroke: Care Instructions ]. \"  ?? ???: 2017? 12? 6? ???  ??? ??: 11.7  © [id number] [employer].  ?? ??? ?? ?? ???? ???? ?? ???? ????. ??? ?? ?? ? ??? ?? ??? ?? ???? ?? ??? ?? ?? ????? ??????. [employer]? ???? ? ??? ???? ?? ?? ??? ???? ??? ?????.